# Patient Record
Sex: MALE | Race: WHITE | NOT HISPANIC OR LATINO | URBAN - NONMETROPOLITAN AREA
[De-identification: names, ages, dates, MRNs, and addresses within clinical notes are randomized per-mention and may not be internally consistent; named-entity substitution may affect disease eponyms.]

---

## 2017-06-05 ENCOUNTER — APPOINTMENT (RX ONLY)
Dept: URBAN - NONMETROPOLITAN AREA CLINIC 22 | Facility: CLINIC | Age: 22
Setting detail: DERMATOLOGY
End: 2017-06-05

## 2017-06-05 VITALS — HEIGHT: 70 IN | WEIGHT: 160 LBS

## 2017-06-05 DIAGNOSIS — L70.0 ACNE VULGARIS: ICD-10-CM

## 2017-06-05 PROCEDURE — ? OTHER

## 2017-06-05 PROCEDURE — ? PRESCRIPTION

## 2017-06-05 PROCEDURE — ? COUNSELING

## 2017-06-05 PROCEDURE — 99213 OFFICE O/P EST LOW 20 MIN: CPT

## 2017-06-05 RX ORDER — DOXYCYCLINE HYCLATE 100 MG/1
CAPSULE, GELATIN COATED ORAL
Qty: 90 | Refills: 0 | Status: ERX | COMMUNITY
Start: 2017-06-05

## 2017-06-05 RX ADMIN — DOXYCYCLINE HYCLATE: 100 CAPSULE, GELATIN COATED ORAL at 00:00

## 2017-06-05 ASSESSMENT — LOCATION SIMPLE DESCRIPTION DERM
LOCATION SIMPLE: LEFT LIP
LOCATION SIMPLE: RIGHT CHEEK
LOCATION SIMPLE: LEFT CHEEK
LOCATION SIMPLE: RIGHT FOREHEAD

## 2017-06-05 ASSESSMENT — LOCATION ZONE DERM
LOCATION ZONE: LIP
LOCATION ZONE: FACE

## 2017-06-05 ASSESSMENT — LOCATION DETAILED DESCRIPTION DERM
LOCATION DETAILED: RIGHT MEDIAL FOREHEAD
LOCATION DETAILED: LEFT CENTRAL MALAR CHEEK
LOCATION DETAILED: RIGHT CENTRAL MALAR CHEEK
LOCATION DETAILED: LEFT LOWER CUTANEOUS LIP

## 2017-06-05 NOTE — PROCEDURE: OTHER
Other (Free Text): His acne is recalcitrant to oral abx therapy, cystic and scarring. He would benefit from therapy with isotretinoin, however, he lives in [place], [place] and is just visiting this area for a few days. He was encouraged to establish care with a Dermatologist that prescribes isotretinoin in that area and I provided him the information for the closest [facility] clinic. Other (Free Text): His acne is recalcitrant to oral abx therapy, cystic and scarring. He would benefit from therapy with isotretinoin, however, he lives in [place], [place] and is just visiting this area for a few days. He was encouraged to establish care with a Dermatologist that prescribes isotretinoin in that area and I provided him the information for the closest [facility] clinic.

## 2019-08-22 ENCOUNTER — APPOINTMENT (RX ONLY)
Dept: URBAN - NONMETROPOLITAN AREA CLINIC 22 | Facility: CLINIC | Age: 24
Setting detail: DERMATOLOGY
End: 2019-08-22

## 2019-08-22 DIAGNOSIS — L70.0 ACNE VULGARIS: ICD-10-CM

## 2019-08-22 PROCEDURE — ? COUNSELING

## 2019-08-22 PROCEDURE — ? ISOTRETINOIN INITIATION

## 2019-08-22 PROCEDURE — ? TREATMENT REGIMEN

## 2019-08-22 PROCEDURE — ? ORDER TESTS

## 2019-08-22 PROCEDURE — 99213 OFFICE O/P EST LOW 20 MIN: CPT

## 2019-08-22 NOTE — PROCEDURE: TREATMENT REGIMEN
Plan: Patient is currently treating acne with oral antibiotic therapy with no success. Patient has tried and failed on multiple topicals and oral medications. He would benefit from therapy with isotretinoin, but lives in [place], [place] and is only here visiting for a few days. He was encouraged to establish care with a Dermatologist in that area. Discussed initiating accutane vs. going on another oral antibiotic. Patient notified we will have to see him monthly or another dermatologist monthly. Patients prescription coverage is only in [place]. Plan is to send in medication here in [place] and follow up with a dermatologist in [place] for his monthly visits and labs. Patient has had no depression in the past. Reviewed all side effects and risks regarding accutane. Other Instructions: Patient is currently treating acne with oral antibiotic therapy with no success. Patient has tried and failed on multiple topicals and oral medications. He would benefit from therapy with isotretinoin, but lives in [place], [place] and is only here visiting for a few days. He was encouraged to establish care with a Dermatologist in that area. Discussed initiating accutane vs. going on another oral antibiotic. Patient notified we will have to see him monthly or another dermatologist monthly. Patients prescription coverage is only in [place]. Plan is to send in medication here in [place] and follow up with a dermatologist in [place] for his monthly visits and labs. Patient has had no depression in the past. Reviewed all side effects and risks regarding accutane.

## 2019-08-26 ENCOUNTER — RX ONLY (OUTPATIENT)
Age: 24
Setting detail: RX ONLY
End: 2019-08-26

## 2019-08-26 RX ORDER — ISOTRETINOIN 20 MG/1
20MG CAPSULE, LIQUID FILLED ORAL BID
Qty: 60 | Refills: 0 | Status: ERX | COMMUNITY
Start: 2019-08-26

## 2022-03-23 ENCOUNTER — APPOINTMENT (RX ONLY)
Dept: URBAN - NONMETROPOLITAN AREA CLINIC 25 | Facility: CLINIC | Age: 27
Setting detail: DERMATOLOGY
End: 2022-03-23

## 2022-03-23 DIAGNOSIS — Z41.9 ENCOUNTER FOR PROCEDURE FOR PURPOSES OTHER THAN REMEDYING HEALTH STATE, UNSPECIFIED: ICD-10-CM

## 2022-03-23 PROCEDURE — ? PATIENT SPECIFIC COUNSELING

## 2022-03-23 NOTE — PROCEDURE: PATIENT SPECIFIC COUNSELING
Detail Level: Zone
Here to discuss scarring. Patient is interested in resurfacing to help with texture. \\nMostly concerned about scarring in glabella region. \\nWe discussed CO2 vs Fraxel vs microneedling. My preference would be CO2. Patient interested in CO2 for glabella region only. Will consider Fraxel for the rest of the face. Will send to cosmetic coordinator to [facility] for more information on cost, procedure, recovery and scheduling.